# Patient Record
Sex: FEMALE | Race: OTHER
[De-identification: names, ages, dates, MRNs, and addresses within clinical notes are randomized per-mention and may not be internally consistent; named-entity substitution may affect disease eponyms.]

---

## 2024-10-25 ENCOUNTER — HOSPITAL ENCOUNTER (OUTPATIENT)
Facility: HOSPITAL | Age: 34
Setting detail: SPECIMEN
Discharge: HOME | End: 2024-10-28

## 2024-10-25 ENCOUNTER — OFFICE VISIT (OUTPATIENT)
Age: 34
End: 2024-10-25

## 2024-10-25 ENCOUNTER — INITIAL PRENATAL (OUTPATIENT)
Age: 34
End: 2024-10-25

## 2024-10-25 VITALS
HEIGHT: 63 IN | BODY MASS INDEX: 37.92 KG/M2 | DIASTOLIC BLOOD PRESSURE: 85 MMHG | OXYGEN SATURATION: 98 % | RESPIRATION RATE: 16 BRPM | WEIGHT: 214 LBS | SYSTOLIC BLOOD PRESSURE: 144 MMHG

## 2024-10-25 DIAGNOSIS — Z59.71 UNINSURED: ICD-10-CM

## 2024-10-25 DIAGNOSIS — Z13.9 ENCOUNTER FOR SCREENING INVOLVING SOCIAL DETERMINANTS OF HEALTH (SDOH): Primary | ICD-10-CM

## 2024-10-25 DIAGNOSIS — O99.210 OBESITY AFFECTING PREGNANCY, ANTEPARTUM, UNSPECIFIED OBESITY TYPE: ICD-10-CM

## 2024-10-25 DIAGNOSIS — O09.90 SUPERVISION OF HIGH RISK PREGNANCY, ANTEPARTUM: Primary | ICD-10-CM

## 2024-10-25 DIAGNOSIS — O10.919 CHRONIC HYPERTENSION AFFECTING PREGNANCY: ICD-10-CM

## 2024-10-25 LAB
BILIRUBIN, URINE, POC: NEGATIVE
BLOOD URINE, POC: NEGATIVE
CREAT UR-MCNC: <13 MG/DL
GLUCOSE URINE, POC: NEGATIVE
KETONES, URINE, POC: NEGATIVE
LEUKOCYTE ESTERASE, URINE, POC: NEGATIVE
NITRITE, URINE, POC: NEGATIVE
PH, URINE, POC: 7 (ref 4.6–8)
PROT UR-MCNC: 5 MG/DL (ref 0–11.9)
PROT/CREAT UR-RTO: <0.4
PROTEIN,URINE, POC: NEGATIVE
SPECIFIC GRAVITY, URINE, POC: 1.01 (ref 1–1.03)
URINALYSIS CLARITY, POC: CLEAR
URINALYSIS COLOR, POC: NORMAL
UROBILINOGEN, POC: NORMAL

## 2024-10-25 PROCEDURE — 87491 CHLMYD TRACH DNA AMP PROBE: CPT

## 2024-10-25 PROCEDURE — 87661 TRICHOMONAS VAGINALIS AMPLIF: CPT

## 2024-10-25 PROCEDURE — 87624 HPV HI-RISK TYP POOLED RSLT: CPT

## 2024-10-25 PROCEDURE — 81003 URINALYSIS AUTO W/O SCOPE: CPT | Performed by: FAMILY MEDICINE

## 2024-10-25 PROCEDURE — 90661 CCIIV3 VAC ABX FR 0.5 ML IM: CPT | Performed by: FAMILY MEDICINE

## 2024-10-25 PROCEDURE — 87591 N.GONORRHOEAE DNA AMP PROB: CPT

## 2024-10-25 PROCEDURE — 88175 CYTOPATH C/V AUTO FLUID REDO: CPT

## 2024-10-25 RX ORDER — NIFEDIPINE 30 MG/1
30 TABLET, EXTENDED RELEASE ORAL DAILY
Qty: 60 TABLET | Refills: 11 | Status: SHIPPED | OUTPATIENT
Start: 2024-10-25

## 2024-10-25 ASSESSMENT — PATIENT HEALTH QUESTIONNAIRE - PHQ9
1. LITTLE INTEREST OR PLEASURE IN DOING THINGS: NOT AT ALL
SUM OF ALL RESPONSES TO PHQ QUESTIONS 1-9: 0
2. FEELING DOWN, DEPRESSED OR HOPELESS: NOT AT ALL
SUM OF ALL RESPONSES TO PHQ QUESTIONS 1-9: 0
SUM OF ALL RESPONSES TO PHQ QUESTIONS 1-9: 0
SUM OF ALL RESPONSES TO PHQ9 QUESTIONS 1 & 2: 0
SUM OF ALL RESPONSES TO PHQ QUESTIONS 1-9: 0

## 2024-10-25 NOTE — PROGRESS NOTES
SW Navigator met with patient to complete initial social needs assessment. Patient verified and confirmed demographics on file.      Review of SDOH:   No social needs at present time    Medical insurance? Uninsured, applied con [facility]    Psychosocial Hx:  - Country of Origin, [place], [place] years in [place]   - Client's feelings about pregnancy, positive  - FOB aware of pregnancy, yes  - Patient and FOB's relationship, no relationship  - FOB's feeling about pregnancy, unknown  - Lives with her children  - Highest level of Education, college  - Employment? , creates wood pallets   - Primary language, [place] Islander  - Prefers written materials in [place] Islander  - Communication issues, language barrier  - WIC? enrolled  - Support system, self  - Personal - Do you have a close network of family and friends, friends only  - Support Services - Are you aware of social programs available to you? Do you use them? yes  - OB: Do you have the support needed once baby arrives? unsure  - Any other worries or concerns, no    Personal Safety:  Domestic violence - no hx of domestic violence   General safety - Feels safe in home and in neighborhood    Patient is 34 y.o. pregnant female. Patient lives with her children; 15 and 4 years of age. Limited support; family lives in [place]. FOB is not involved. Patient currently employed and plans to work until she's no longer able to.  Patient receives WIC and SNAP benefits, pending Medicaid coverage. Patient plans to breat feed baby; SW to request pump later in pregnancy.    Concern:  1) uninsured    Plan:  1) Medicaid pending; follow-up with [facility]    EDIN Villegas   Navigator

## 2024-10-25 NOTE — PROGRESS NOTES
History and Physical    Patient: Tamiko Her MRN: [id number]  SSN: [id number]    YOB: 1990  Age: 34 y.o.  Sex: female      Subjective:      Tamiko Her is a 34 y.o. female  at 10w4d who presents for IOB visit.     Unplanned, happy  FOB not involved, isn't sure if he'll support her after baby is born  Works in wood cutting   2 other children who live with her   Is certain of LMP  Seen at [facility] for bleeding, resolved, US was reassuring at that time  When at [facility], was given BP meds and was taking it but stopped the medication once the numbers went down, had been checking on home cuff.      No past medical history on file.  No past surgical history on file.   No family history on file.  Social History     Tobacco Use    Smoking status: Not on file    Smokeless tobacco: Not on file   Substance Use Topics    Alcohol use: Not on file      Prior to Admission medications    Not on File        No Known Allergies    Review of Systems:  ROS negative except as noted in HPI.    Objective:     Vitals:    10/25/24 1321   BP: (!) 144/85   Site: Left Upper Arm   Position: Sitting   Cuff Size: Medium Adult   Resp: 16   SpO2: 98%   Weight: 97.1 kg (214 lb)   Height: 1.59 m (5' 2.6\")        Physical Exam:  See prenatal physical exam.    Assessment/Plan:   33yo  @ 10w4d by LMP=7w dating US   IUP: IOB labs today, dating US in CareEverywhere, anatomy requested  Chronic HTN: new dx, was very high at [facility] recently, will start Nifedipine 30 XL bid, baseline labs today, GTT+A1C, ASA at 12w  Obesity    Signed By: Raine Yang,      2024

## 2024-10-26 LAB
ABO + RH BLD: NORMAL
BACTERIA SPEC CULT: NORMAL
BLOOD BANK CMNT PATIENT-IMP: NORMAL
BLOOD GROUP ANTIBODIES SERPL: NORMAL
CC UR VC: NORMAL
Lab: NORMAL
Lab: NORMAL
REFERENCE LAB: NORMAL
SERVICE CMNT-IMP: NORMAL
SPECIMEN EXP DATE BLD: NORMAL

## 2024-10-27 LAB
ALBUMIN SERPL-MCNC: 3.8 G/DL (ref 3.5–5)
ALBUMIN/GLOB SERPL: 1.1 (ref 1.1–2.2)
ALP SERPL-CCNC: 65 U/L (ref 45–117)
ALT SERPL-CCNC: 25 U/L (ref 12–78)
ANION GAP SERPL CALC-SCNC: 8 MMOL/L (ref 2–12)
AST SERPL-CCNC: 11 U/L (ref 15–37)
BILIRUB SERPL-MCNC: 0.3 MG/DL (ref 0.2–1)
BUN SERPL-MCNC: 9 MG/DL (ref 6–20)
BUN/CREAT SERPL: 14 (ref 12–20)
CALCIUM SERPL-MCNC: 9 MG/DL (ref 8.5–10.1)
CHLORIDE SERPL-SCNC: 109 MMOL/L (ref 97–108)
CO2 SERPL-SCNC: 22 MMOL/L (ref 21–32)
CREAT SERPL-MCNC: 0.63 MG/DL (ref 0.55–1.02)
ERYTHROCYTE [DISTWIDTH] IN BLOOD BY AUTOMATED COUNT: 13.6 % (ref 11.5–14.5)
EST. AVERAGE GLUCOSE BLD GHB EST-MCNC: 103 MG/DL
GLOBULIN SER CALC-MCNC: 3.5 G/DL (ref 2–4)
GLUCOSE 1H P 100 G GLC PO SERPL-MCNC: 96 MG/DL (ref 65–140)
GLUCOSE SERPL-MCNC: 99 MG/DL (ref 65–100)
HBA1C MFR BLD: 5.2 % (ref 4–5.6)
HBV SURFACE AB SER QL: NONREACTIVE
HBV SURFACE AB SER-ACNC: <3.1 MIU/ML
HBV SURFACE AG SER QL: 0.47 INDEX
HBV SURFACE AG SER QL: NEGATIVE
HCT VFR BLD AUTO: 36.9 % (ref 35–47)
HCV AB SER IA-ACNC: <0.02 INDEX
HCV AB SERPL QL IA: NONREACTIVE
HGB BLD-MCNC: 12.6 G/DL (ref 11.5–16)
HIV 1+2 AB+HIV1 P24 AG SERPL QL IA: NONREACTIVE
HIV 1/2 RESULT COMMENT: NORMAL
MCH RBC QN AUTO: 30.6 PG (ref 26–34)
MCHC RBC AUTO-ENTMCNC: 34.1 G/DL (ref 30–36.5)
MCV RBC AUTO: 89.6 FL (ref 80–99)
NRBC # BLD: 0 K/UL (ref 0–0.01)
NRBC BLD-RTO: 0 PER 100 WBC
PLATELET # BLD AUTO: 332 K/UL (ref 150–400)
PMV BLD AUTO: 9.4 FL (ref 8.9–12.9)
POTASSIUM SERPL-SCNC: 3.4 MMOL/L (ref 3.5–5.1)
PROT SERPL-MCNC: 7.3 G/DL (ref 6.4–8.2)
RBC # BLD AUTO: 4.12 M/UL (ref 3.8–5.2)
RPR SER QL: NONREACTIVE
RUBV IGG SERPL IA-ACNC: NORMAL IU/ML
SODIUM SERPL-SCNC: 139 MMOL/L (ref 136–145)
VZV IGG SER IA-ACNC: REACTIVE
WBC # BLD AUTO: 8.7 K/UL (ref 3.6–11)

## 2024-10-29 ENCOUNTER — TELEPHONE (OUTPATIENT)
Age: 34
End: 2024-10-29

## 2024-10-29 LAB
C TRACH RRNA SPEC QL NAA+PROBE: NEGATIVE
HBV CORE AB SERPL QL IA: NEGATIVE
HGB A MFR BLD: 97 % (ref 96.4–98.8)
HGB A2 MFR BLD COLUMN CHROM: 2.7 % (ref 1.8–3.2)
HGB F MFR BLD: 0.3 % (ref 0–2)
HGB FRACT BLD-IMP: NORMAL
HGB S MFR BLD: 0 %
HPV I/H RISK 1 DNA CVX QL PROBE+SIG AMP: NEGATIVE
N GONORRHOEA RRNA SPEC QL NAA+PROBE: NEGATIVE
SPECIMEN SOURCE: NORMAL
T VAGINALIS RRNA SPEC QL NAA+PROBE: NEGATIVE

## 2024-10-29 NOTE — TELEPHONE ENCOUNTER
Received an referral to have pt seen with [facility]. Scheduled pt for 01/08 @ 8:45 at [facility]. Called pt x2 via  id # [id number] to confirm appt, no answer, left vm to have pt give the office a call. Called friend on friend and the number is disconnected.

## 2024-10-31 ENCOUNTER — TELEPHONE (OUTPATIENT)
Age: 34
End: 2024-10-31

## 2024-10-31 NOTE — TELEPHONE ENCOUNTER
1st attempt unable to reach patient.Left vociemail for patient with interperter services to callback to schedule appointment

## 2024-11-05 ENCOUNTER — TELEPHONE (OUTPATIENT)
Age: 34
End: 2024-11-05

## 2024-11-05 NOTE — TELEPHONE ENCOUNTER
: #[id number]     Spoke with Porfirio (Friend) who identified patient with two patient identifiers (name and ).    On Release of Information Form? No    This nurse informed caller that patient has not listed him as someone that can receive medical advice on patient's behalf. Caller stated that he was calling to help schedule patient an appointment for bilateral leg pain as patient is currently at work and not with him.    When asked by this nurse, caller stated that patient has had no shortness of breath, dyspnea on exertion, or chest pain.    Caller asked for patient to be scheduled for Thursday or Friday; appointment scheduled. This nurse offered to have patient seen 24 at 3:00 PM with Dr. Ferguson; caller declined at this time stating that he wanted to speak with patient prior to scheduling same day appointment.    Caller agreed and voiced understanding with no further questions or concerns at this time.     Future Appointments   Date Time Provider Department Center   2024  3:20 PM Luis Enrique Taylor MD [facility] DEP   11/15/2024  3:40 PM Raine Yang DO [facility] DEP   2025  8:45 AM ULTRASOUND 1 AMB [facility] BS MARIANNE Marrero RN

## 2024-11-15 ENCOUNTER — ROUTINE PRENATAL (OUTPATIENT)
Age: 34
End: 2024-11-15

## 2024-11-15 VITALS
OXYGEN SATURATION: 98 % | HEIGHT: 63 IN | DIASTOLIC BLOOD PRESSURE: 77 MMHG | HEART RATE: 85 BPM | SYSTOLIC BLOOD PRESSURE: 125 MMHG | BODY MASS INDEX: 38.7 KG/M2 | RESPIRATION RATE: 17 BRPM | WEIGHT: 218.4 LBS

## 2024-11-15 DIAGNOSIS — Z13.9 ENCOUNTER FOR SCREENING INVOLVING SOCIAL DETERMINANTS OF HEALTH (SDOH): Primary | ICD-10-CM

## 2024-11-15 DIAGNOSIS — O09.90 SUPERVISION OF HIGH RISK PREGNANCY, ANTEPARTUM: ICD-10-CM

## 2024-11-15 RX ORDER — AMLODIPINE BESYLATE 5 MG/1
5 TABLET ORAL DAILY
COMMUNITY
Start: 2024-09-29

## 2024-11-15 SDOH — ECONOMIC STABILITY: INCOME INSECURITY: HOW HARD IS IT FOR YOU TO PAY FOR THE VERY BASICS LIKE FOOD, HOUSING, MEDICAL CARE, AND HEATING?: HARD

## 2024-11-15 SDOH — ECONOMIC STABILITY: FOOD INSECURITY: WITHIN THE PAST 12 MONTHS, THE FOOD YOU BOUGHT JUST DIDN'T LAST AND YOU DIDN'T HAVE MONEY TO GET MORE.: PATIENT DECLINED

## 2024-11-15 SDOH — ECONOMIC STABILITY: FOOD INSECURITY: WITHIN THE PAST 12 MONTHS, YOU WORRIED THAT YOUR FOOD WOULD RUN OUT BEFORE YOU GOT MONEY TO BUY MORE.: SOMETIMES TRUE

## 2024-11-15 NOTE — PATIENT INSTRUCTIONS
https://www.[employer]/individuals-families  [employer].com  Lo que ofrecen: recursos gratuitos para ayudar a encontrar un hogar que se adapte a henry necesidades y presupuesto. Los proveedores de propiedades pueden enumerar apartamentos o zavaleta para alquilar en cualquier momento.  Sitio web: https://www.[employer]/.  Número de teléfono: [phone], de lunes a viernes de 9:00 a. m. a 8:00 p. m.

## 2024-11-15 NOTE — PROGRESS NOTES
Pain that radiates from low back/butt down her leg, electric feeling, bilateral, tylenol helps  Checking BP at home: systolic 122, 130, 120    35yo  @ 13w4d by LMP=7w dating US   IUP: RH pos, dating US in CareEverywhere, anatomy requested - needs it changed to a Friday, Ada will change it and we can give her the updated info at next appt here   Chronic HTN: new dx, was very high at [facility] recently, controlled on Nifedipine 30 XL bid, baseline UPC \"<0.4\", GTT passed, A1C 5.2, ASA at 12w  Obesity  Sciatica pain: counseled, handout given for home exercises, tylenol prn

## 2024-11-15 NOTE — PROGRESS NOTES
Tamiko Patino is a 34 y.o. female      Chief Complaint   Patient presents with    Routine Prenatal Visit     Have you been able to feel fetal movement? -yes  Bleeding? -no  Loss of Fluids? -no  Discharge? -no  Contractions? -no    - concerns: back pain-that comes and goes - it runs from her back into the bottom of her feet for x3 weeks but has gotten slightly better    - taking Tylenol for pain   :Marixa GRAYSON#[id number]       \"Have you been to the ER, urgent care clinic since your last visit?  Hospitalized since your last visit?\"    NO    “Have you seen or consulted any other health care providers outside of [facility] since your last visit?”    NO              Vitals:    11/15/24 1556   BP: 125/77   Site: Right Upper Arm   Position: Sitting   Cuff Size: Large Adult   Pulse: 85   Resp: 17   SpO2: 98%   Weight: 99.1 kg (218 lb 6.4 oz)   Height: 1.59 m (5' 2.6\")            Health Maintenance Due   Topic Date Due    Varicella vaccine (1 of 2 - 13+ 2-dose series) Never done    Hepatitis B vaccine (1 of 3 - 19+ 3-dose series) Never done    DTaP/Tdap/Td vaccine (1 - Tdap) Never done    COVID-19 Vaccine (1 - 2023-24 season) Never done         Medication Reconciliation completed, changes noted.  Please  Update medication list.

## 2024-11-18 LAB
AFP INTERP SERPL-IMP: NORMAL
AFP MOM SERPL: 0.61
AFP SERPL-MCNC: 14.3 NG/ML
AGE AT DELIVERY: 35.5 YR
AGE OF EGG DONOR: NORMAL
AGE OF EGG DONOR: NORMAL
COMMENT: NORMAL
DONOR EGG?: NO
DONOR EGG?: NORMAL
FAMILY HISTORY NTD: NORMAL
FHX NTD (Y OR N): NORMAL
GA METHOD: NORMAL
GA: 15.1 WEEKS
IDDM PATIENT QL: NO
INSULIN DEP. DIABETIC: NORMAL
Lab: 218
Lab: NORMAL
Lab: NORMAL
MAT SCN FOR FETAL ABNORMALITIES SERPL: NORMAL
MULTIPLE PREGNANCY: NO
NEURAL TUBE DEFECT RISK FETUS: NORMAL
NUMBER OF FETUSES: NO
OTHER INDICATIONS: NO
OTHER INDICATIONS: NORMAL
PREVIOUSLY ELEVATED AFP (Y OR N): 13
PREVIOUSLY ELEVATED AFP (Y OR N): NO
PRIOR 1ST TRIM TESTING ?: NO
PRIOR 2ND TRIM TESTING ?: NO
PRIOR DS/NTD SCREEN CURRENT PREGNANCY?: NO
PRIOR DS/NTD SCREEN CURRENT PREGNANCY?: NORMAL
PRIOR FIRST TRIMESTER TESTING (Y OR N ): NORMAL
PRIOR PREGNANCY WITH DOWN SYNDROME (Y OR N): 1
PRIOR PREGNANCY WITH DOWN SYNDROME (Y OR N): NO
TYPE OF EGG DONOR: NORMAL
TYPE OF EGG DONOR: NORMAL

## 2024-11-24 LAB
Lab: NORMAL
NTRA FETAL FRACTION: NORMAL
NTRA GENDER OF FETUS: NORMAL
NTRA MONOSOMY X AGE-BASED RISK TEXT: NORMAL
NTRA MONOSOMY X RESULT TEXT: NORMAL
NTRA MONOSOMY X RISK SCORE TEXT: NORMAL
NTRA TRIPLOIDY RESULT TEXT: NORMAL
NTRA TRISOMY 13 AGE-BASED RISK TEXT: NORMAL
NTRA TRISOMY 13 RESULT TEXT: NORMAL
NTRA TRISOMY 13 RISK SCORE TEXT: NORMAL
NTRA TRISOMY 18 AGE-BASED RISK TEXT: NORMAL
NTRA TRISOMY 18 RESULT TEXT: NORMAL
NTRA TRISOMY 18 RISK SCORE TEXT: NORMAL
NTRA TRISOMY 21 AGE-BASED RISK TEXT: NORMAL
NTRA TRISOMY 21 RESULT TEXT: NORMAL
NTRA TRISOMY 21 RISK SCORE TEXT: NORMAL

## 2024-12-04 LAB
Lab: NEGATIVE
Lab: NORMAL
NTRA ALPHA-THALASSEMIA: NEGATIVE
NTRA BETA-HEMOGLOBINOPATHIES: NEGATIVE
NTRA CANAVAN DISEASE: NEGATIVE
NTRA CYSTIC FIBROSIS: NEGATIVE
NTRA DUCHENNE/BECKER MUSCULAR DYSTROPHY: NEGATIVE
NTRA FAMILIAL DYSAUTONOMIA: NEGATIVE
NTRA FRAGILE X SYNDROME: NEGATIVE
NTRA GALACTOSEMIA: NEGATIVE
NTRA GAUCHER DISEASE: NEGATIVE
NTRA MEDIUM CHAIN ACYL-COA DEHYDROGENASE DEFICIENCY: NEGATIVE
NTRA POLYCYSTIC KIDNEY DISEASE, AUTOSOMAL RECESSIVE: NEGATIVE
NTRA SMITH-LEMLI-OPITZ SYNDROME: NEGATIVE
NTRA SPINAL MUSCULAR ATROPHY: NEGATIVE
NTRA TAY-SACHS DISEASE: NEGATIVE

## 2024-12-13 ENCOUNTER — ROUTINE PRENATAL (OUTPATIENT)
Age: 34
End: 2024-12-13

## 2024-12-13 VITALS
TEMPERATURE: 98.6 F | HEART RATE: 80 BPM | HEIGHT: 63 IN | SYSTOLIC BLOOD PRESSURE: 146 MMHG | BODY MASS INDEX: 38.84 KG/M2 | OXYGEN SATURATION: 98 % | RESPIRATION RATE: 16 BRPM | DIASTOLIC BLOOD PRESSURE: 83 MMHG | WEIGHT: 219.2 LBS

## 2024-12-13 DIAGNOSIS — O09.90 SUPERVISION OF HIGH RISK PREGNANCY, ANTEPARTUM: ICD-10-CM

## 2024-12-13 DIAGNOSIS — Z3A.17 17 WEEKS GESTATION OF PREGNANCY: Primary | ICD-10-CM

## 2024-12-13 DIAGNOSIS — O10.919 CHRONIC HYPERTENSION AFFECTING PREGNANCY: ICD-10-CM

## 2024-12-13 RX ORDER — NIFEDIPINE 30 MG/1
30 TABLET, EXTENDED RELEASE ORAL 2 TIMES DAILY
Qty: 60 TABLET | Refills: 11 | Status: SHIPPED | OUTPATIENT
Start: 2024-12-13

## 2024-12-13 NOTE — PROGRESS NOTES
Session Code 72608 / : Gianfranco #[id number]   Department Code: [phone]       No chief complaint on file.      Patient identified with 2 patient identifiers (name and D.O.B)    Patient is a  at 17w4d    Leakage of Fluid: NO  Vaginal Bleeding: NO  Prenatal vitamins: YES  Having Contractions: NO  Pain: NO    LMP 2024 (Exact Date)     Immunization History   Administered Date(s) Administered    Influenza, FLUCELVAX, (age 6 mo+) IM, Trivalent PF, 0.5mL 10/25/2024       1. Have you been to the ER, urgent care clinic since your last visit?  Hospitalized since your last visit?NO    2. Have you seen or consulted any other health care providers outside of the [facility] since your last visit?  Include any pap smears or colon screening. NO    
this encounter.    Labor precautions discussed, including: Regular painful contractions, lasting for greater than one hour, taking your breath away; any vaginal bleeding; any leakage of fluid; or absent or decreased fetal movement. Call M.D. on call if any of these symptoms or signs occur.    I have discussed the diagnosis with the patient and the intended plan as seen in the above orders.  The patient has received an after-visit summary and questions were answered concerning future plans.  I have discussed medication side effects and warnings with the patient as well. Informed pt to return to the office or go to the ER if she experiences vaginal bleeding, vaginal discharge, leaking of fluid, pelvic cramping.    Pt seen and discussed with Dr. Yang (attending physician)    Alisha Ferguson MD  Family Medicine Resident

## 2024-12-20 ENCOUNTER — ROUTINE PRENATAL (OUTPATIENT)
Age: 34
End: 2024-12-20

## 2024-12-20 VITALS
TEMPERATURE: 98.9 F | WEIGHT: 219.2 LBS | SYSTOLIC BLOOD PRESSURE: 117 MMHG | OXYGEN SATURATION: 98 % | DIASTOLIC BLOOD PRESSURE: 77 MMHG | RESPIRATION RATE: 15 BRPM | HEIGHT: 63 IN | BODY MASS INDEX: 38.84 KG/M2 | HEART RATE: 79 BPM

## 2024-12-20 DIAGNOSIS — Z3A.18 18 WEEKS GESTATION OF PREGNANCY: Primary | ICD-10-CM

## 2024-12-20 DIAGNOSIS — O10.919 CHRONIC HYPERTENSION AFFECTING PREGNANCY: ICD-10-CM

## 2024-12-20 NOTE — PROGRESS NOTES
Session Code 48358 / : #[id number]   Department Code: [phone]       Chief Complaint   Patient presents with    Routine Prenatal Visit     No concerns.        Patient identified with 2 patient identifiers (name and D.O.B)    Patient is a  at 18w4d    Leakage of Fluid: NO  Vaginal Bleeding: NO  Prenatal vitamins: YES  Having Contractions: NO  Pain: NO    LMP 2024 (Exact Date)     Immunization History   Administered Date(s) Administered    Influenza, FLUCELVAX, (age 6 mo+) IM, Trivalent PF, 0.5mL 10/25/2024       1. Have you been to the ER, urgent care clinic since your last visit?  Hospitalized since your last visit?NO    2. Have you seen or consulted any other health care providers outside of the [facility] since your last visit?  Include any pap smears or colon screening. NO    
decreased fetal movement. Call M.D. on call if any of these symptoms or signs occur.    I have discussed the diagnosis with the patient and the intended plan as seen in the above orders.  The patient has received an after-visit summary and questions were answered concerning future plans.  I have discussed medication side effects and warnings with the patient as well. Informed pt to return to the office or go to the ER if she experiences vaginal bleeding, vaginal discharge, leaking of fluid, pelvic cramping.    Pt seen and discussed with Dr. Yang (attending physician)    Alisha Ferguson MD  Family Medicine Resident

## 2025-01-10 ENCOUNTER — ROUTINE PRENATAL (OUTPATIENT)
Age: 35
End: 2025-01-10

## 2025-01-10 VITALS
DIASTOLIC BLOOD PRESSURE: 82 MMHG | SYSTOLIC BLOOD PRESSURE: 138 MMHG | WEIGHT: 224 LBS | RESPIRATION RATE: 16 BRPM | OXYGEN SATURATION: 98 % | BODY MASS INDEX: 40.19 KG/M2

## 2025-01-10 DIAGNOSIS — O10.919 CHRONIC HYPERTENSION AFFECTING PREGNANCY: ICD-10-CM

## 2025-01-10 DIAGNOSIS — O09.90 SUPERVISION OF HIGH RISK PREGNANCY, ANTEPARTUM: ICD-10-CM

## 2025-01-10 RX ORDER — NIFEDIPINE 30 MG/1
30 TABLET, EXTENDED RELEASE ORAL 2 TIMES DAILY
Qty: 60 TABLET | Refills: 11 | Status: SHIPPED | OUTPATIENT
Start: 2025-01-10

## 2025-01-10 ASSESSMENT — PATIENT HEALTH QUESTIONNAIRE - PHQ9
SUM OF ALL RESPONSES TO PHQ QUESTIONS 1-9: 0
SUM OF ALL RESPONSES TO PHQ QUESTIONS 1-9: 0
1. LITTLE INTEREST OR PLEASURE IN DOING THINGS: NOT AT ALL
SUM OF ALL RESPONSES TO PHQ QUESTIONS 1-9: 0
SUM OF ALL RESPONSES TO PHQ QUESTIONS 1-9: 0
2. FEELING DOWN, DEPRESSED OR HOPELESS: NOT AT ALL
SUM OF ALL RESPONSES TO PHQ9 QUESTIONS 1 & 2: 0

## 2025-01-10 NOTE — PROGRESS NOTES
Pain that radiates from low back/butt down her leg, electric feeling, bilateral, tylenol helps  Checking BP at home: systolic 122, 130, 120    33yo  @ 21w4d by LMP=7w dating US   IUP: RH pos, dating US in CareEverywhere, missed anatomy, rescheduled  Chronic HTN: new dx, was very high at [facility] recently, controlled on Nifedipine 30 XL bid, baseline UPC \"<0.4\", GTT passed, A1C 5.2, ASA at 12w  Obesity: early GTT+A1C normal   Sciatica pain: counseled, handout given for home exercises, tylenol prn

## 2025-01-10 NOTE — PROGRESS NOTES
Chief Complaint   Patient presents with    Routine Prenatal Visit        Patient identified by name and . Patient is a  at 21w4d.    Taking prenatal vitamins: Yes  Leakage of fluid: No  Vaginal bleeding: No  Feeling baby move if over 20 weeks: Yes  Contractions: No  Pain: No    Vitals:    01/10/25 1603   BP: 138/82   Site: Left Upper Arm   Position: Sitting   Cuff Size: Large Adult   Resp: 16   SpO2: 98%   Weight: 101.6 kg (224 lb)        Immunization History   Administered Date(s) Administered    Influenza, FLUCELVAX, (age 6 mo+) IM, Trivalent PF, 0.5mL 10/25/2024       1. Have you been to the ER, urgent care clinic since your last visit?  Hospitalized since your last visit?No    2. Have you seen or consulted any other health care providers outside of the [facility] since your last visit?  Include any pap smears or colon screening. No    Patient informed about upcoming appointments and given handout with date/time/location.

## 2025-01-17 ENCOUNTER — ROUTINE PRENATAL (OUTPATIENT)
Age: 35
End: 2025-01-17

## 2025-01-17 ENCOUNTER — ROUTINE PRENATAL (OUTPATIENT)
Age: 35
End: 2025-01-17
Payer: MEDICAID

## 2025-01-17 VITALS — DIASTOLIC BLOOD PRESSURE: 83 MMHG | HEART RATE: 89 BPM | SYSTOLIC BLOOD PRESSURE: 125 MMHG

## 2025-01-17 DIAGNOSIS — O09.522 SUPERVISION OF ELDERLY MULTIGRAVIDA IN SECOND TRIMESTER: Primary | ICD-10-CM

## 2025-01-17 DIAGNOSIS — Z3A.22 22 WEEKS GESTATION OF PREGNANCY: ICD-10-CM

## 2025-01-17 DIAGNOSIS — Z3A.22 22 WEEKS GESTATION OF PREGNANCY: Primary | ICD-10-CM

## 2025-01-17 PROCEDURE — 76811 OB US DETAILED SNGL FETUS: CPT | Performed by: OBSTETRICS & GYNECOLOGY

## 2025-01-17 PROCEDURE — 99203 OFFICE O/P NEW LOW 30 MIN: CPT | Performed by: OBSTETRICS & GYNECOLOGY

## 2025-01-17 PROCEDURE — 76817 TRANSVAGINAL US OBSTETRIC: CPT | Performed by: OBSTETRICS & GYNECOLOGY

## 2025-01-17 NOTE — PROGRESS NOTES
Patient was seen 1/17/2025      Please look under media to view full consult and ultrasound report in ViewPoint.         Olvin Child MD  Maternal Fetal Medicine

## 2025-01-17 NOTE — PROGRESS NOTES
Tamiko Patino is a 34 y.o. female seen on 25 in our [place] office for genetic counseling regarding advanced maternal age. Tamiko Patino will be 34 at the Estimated Date of Delivery: 25; . Genetic counseling was performed in person today. The patient was unaccompanied to her appointment. Due to a language barrier, an [employer]  was used for the duration of today's appointment ( ID# [id number]).    Impression and Recommendations:    - Advanced maternal age was reviewed with the patient, including relevant genetic risks, screening and testing options, her normal NIPT results and today's limited views on ultrasound.  - The patient DECLINED diagnostic amniocentesis at this time, although she may consider it at a future date if abnormalities are seen.  - The patient's normal carrier screening results were reviewed today.  - An ultrasound and MFM consult were performed today by Dr. Olvin Child MD. Please see his note for further details.    Family and pregnancy histories were taken. The following information was discussed with the patient:    Genetic Screening:    The association between advancing maternal age and increasing risk of chromosomal aneuploidy was reviewed. Based on the patient's age of 34 at delivery, her initial risk to have a baby with Down syndrome was 1 in 349 and her initial risk to have a baby with any chromosomal aneuploidy was 1 in 179. Fortunately, the patient has had normal non-invasive prenatal testing (NIPT), indicating a <1 in 10,000 risk for Down syndrome, trisomy 18, trisomy 13 and sex chromosomal aneuploidies in the current pregnancy. Results were consistent with a male fetus.    Nevertheless, the benefits, risks and limitations of non-invasive prenatal testing (NIPT), ultrasonography and amniocentesis in the diagnosis of fetal aneuploidy were reviewed.     Amniocentesis is typically performed between 16 and 20 weeks

## 2025-01-17 NOTE — PROCEDURES
PATIENT: CATRACHITA [place]   -  : 1990   -  DOS:2025   -  INTERPRETING PROVIDER:Olvin Child,   Indication  ========    Advanced Maternal Age, Obesity in pregnancy    Method  ======    Transabdominal ultrasound examination. View: suboptimal due to maternal acoustic properties    Dating  ======    LMP on: 2024  GA by LMP 22 w + 4 d  SHAMA by LMP: 2025  Previous Ultrasound on: 2024  Type of prior assessment: GA  GA at prior assessment date 7 w + 1 d  GA by previous U/S 22 w + 6 d  SHAMA by previous Ultrasound: 2025  Ultrasound examination on: 2025  GA by U/S based upon: AC, BPD, Femur, HC  GA by U/S 24 w + 0 d  SHAMA by U/S: 2025  Assigned: based on the LMP, selected on 2025  Assigned GA 22 w + 4 d  Assigned SHAMA: 2025    Fetal Growth Overview  =================    Exam date        GA              BPD (mm)          HC (mm)              AC (mm)               FL (mm)             HL (mm)            EFW (g)  2025        22w 4d        59.4     94%        220.2    89%        202.9     96%        39.8    48%        39.8     90%        653    96%    Fetal Biometry  ============    Standard  BPD 59.4 mm 24w 2d 94% Hadlock  OFD 78.9 mm 25w 5d >99% William  .2 mm 24w 0d 89% Hadlock  Cerebellum tr 24.7 mm 22w 4d 77% Hill  .9 mm 24w 6d 96% Hadlock  Femur 39.8 mm 22w 6d 48% Hadlock  Humerus 39.8 mm 24w 1d 90% William   g 23w 6d 96% Hadlock  EFW (lb) 1 lb  EFW (oz) 7 oz  EFW by: Hadlock (BPD-HC-AC-FL)  Extended   3.2 mm  CM 6.5 mm  77% Nicolaides  Nasal bone 7.9 mm  Head / Face / Neck  Nasal bone: present  Other Structures   bpm    General Evaluation  ==============    Cardiac activity present.  bpm. Fetal movements: visualized. Presentation: Cephalic  Placenta: Placental site: anterior, PREVIA  Umbilical cord: Cord vessels: 3 vessel cord. Insertion site: central  Amniotic fluid: Amount of AF: normal. MVP 5.7 cm    Fetal

## 2025-02-07 ENCOUNTER — ROUTINE PRENATAL (OUTPATIENT)
Age: 35
End: 2025-02-07

## 2025-02-07 VITALS
SYSTOLIC BLOOD PRESSURE: 112 MMHG | WEIGHT: 225.8 LBS | TEMPERATURE: 98.5 F | HEART RATE: 79 BPM | OXYGEN SATURATION: 97 % | DIASTOLIC BLOOD PRESSURE: 69 MMHG | RESPIRATION RATE: 15 BRPM | HEIGHT: 63 IN | BODY MASS INDEX: 40.01 KG/M2

## 2025-02-07 DIAGNOSIS — O09.90 SUPERVISION OF HIGH RISK PREGNANCY, ANTEPARTUM: ICD-10-CM

## 2025-02-07 DIAGNOSIS — O10.919 CHRONIC HYPERTENSION AFFECTING PREGNANCY: ICD-10-CM

## 2025-02-07 DIAGNOSIS — Z3A.25 25 WEEKS GESTATION OF PREGNANCY: Primary | ICD-10-CM

## 2025-02-07 PROCEDURE — 0502F SUBSEQUENT PRENATAL CARE: CPT

## 2025-02-11 NOTE — PROGRESS NOTES
I reviewed with the resident the medical history and the resident's findings on the physical examination.  I discussed with the resident the patient's diagnosis and concur with the plan.     Christina Kim MD 2/11/2025   
Session Code [id number] / : Sid #[id number]   Department Code: [phone]       No chief complaint on file.      Patient identified with 2 patient identifiers (name and D.O.B)    Patient is a  at 25w4d    Leakage of Fluid: NO  Vaginal Bleeding: NO  Fetal Movement if > 20 weeks: YES  Prenatal vitamins: YES  Having Contractions: NO  Pain: NO    Wt 102.4 kg (225 lb 12.8 oz)   LMP 2024 (Exact Date)   BMI 40.51 kg/m²     Immunization History   Administered Date(s) Administered    Influenza, FLUCELVAX, (age 6 mo+) IM, Trivalent PF, 0.5mL 10/25/2024       1. Have you been to the ER, urgent care clinic since your last visit?  Hospitalized since your last visit?NO    2. Have you seen or consulted any other health care providers outside of the [facility] since your last visit?  Include any pap smears or colon screening. NO    
Provider: Marty Zurita mgmt. in labor: TBD  Feeding: TBD  Circ:  TBD  Postpartum BC: TBD  ---------------------------------------    No orders of the defined types were placed in this encounter.    Labor precautions discussed, including: Regular painful contractions, lasting for greater than one hour, taking your breath away; any vaginal bleeding; any leakage of fluid; or absent or decreased fetal movement. Call M.D. on call if any of these symptoms or signs occur.    I have discussed the diagnosis with the patient and the intended plan as seen in the above orders.  The patient has received an after-visit summary and questions were answered concerning future plans.  I have discussed medication side effects and warnings with the patient as well. Informed pt to return to the office or go to the ER if she experiences vaginal bleeding, vaginal discharge, leaking of fluid, pelvic cramping.    Pt seen and discussed with Dr. Kim (attending physician)    Alisha Ferguson MD  Family Medicine Resident

## 2025-02-14 ENCOUNTER — ROUTINE PRENATAL (OUTPATIENT)
Age: 35
End: 2025-02-14
Payer: MEDICAID

## 2025-02-14 VITALS — SYSTOLIC BLOOD PRESSURE: 121 MMHG | DIASTOLIC BLOOD PRESSURE: 78 MMHG | HEART RATE: 88 BPM

## 2025-02-14 DIAGNOSIS — O99.212 OBESITY AFFECTING PREGNANCY IN SECOND TRIMESTER, UNSPECIFIED OBESITY TYPE: ICD-10-CM

## 2025-02-14 DIAGNOSIS — O09.522 AMA (ADVANCED MATERNAL AGE) MULTIGRAVIDA 35+, SECOND TRIMESTER: ICD-10-CM

## 2025-02-14 DIAGNOSIS — O10.919 CHRONIC HYPERTENSION AFFECTING PREGNANCY: Primary | ICD-10-CM

## 2025-02-14 PROCEDURE — 99213 OFFICE O/P EST LOW 20 MIN: CPT | Performed by: OBSTETRICS & GYNECOLOGY

## 2025-02-14 PROCEDURE — 76817 TRANSVAGINAL US OBSTETRIC: CPT | Performed by: OBSTETRICS & GYNECOLOGY

## 2025-02-14 PROCEDURE — 76816 OB US FOLLOW-UP PER FETUS: CPT | Performed by: OBSTETRICS & GYNECOLOGY

## 2025-02-14 NOTE — PROGRESS NOTES
Patient was seen 2/14/2025      Please look under media to view full consult and ultrasound report in ViewPoint.         Olvin Child MD  Maternal Fetal Medicine

## 2025-02-14 NOTE — PROCEDURES
PATIENT: CATRACHITA [place]   -  : 1990   -  DOS:2025   -  INTERPRETING PROVIDER:Olvin Child,   Indication  ========    Advanced Maternal Age, Obesity in pregnancy    Method  ======    Transabdominal and transvaginal ultrasound examination. View: suboptimal due to maternal acoustic properties. suboptimal due to advanced gestational age. suboptimal  shadowing from fetal limbs    Pregnancy  =========    Arevalo pregnancy. Number of fetuses: 1    Dating  ======    LMP on: 2024  GA by LMP 26 w + 4 d  SHAMA by LMP: 2025  Previous Ultrasound on: 2024  Type of prior assessment: GA  GA at prior assessment date 7 w + 1 d  GA by previous U/S 26 w + 6 d  SHAMA by previous Ultrasound: 2025  Ultrasound examination on: 2025  GA by U/S based upon: AC, BPD, Femur, HC  GA by U/S 28 w + 4 d  SHAMA by U/S: 2025  Assigned: based on the LMP, selected on 2025  Assigned GA 26 w + 4 d  Assigned SHAMA: 2025    Fetal Biometry  ============    Standard  BPD 71.3 mm 28w 4d 94% Hadlock  OFD 91.6 mm 29w 4d 99% William  .5 mm 28w 2d 80% Hadlock  .4 mm 28w 6d 94% Hadlock  Femur 54.0 mm 28w 4d 89% Hadlock  EFW 1,264 g 28w 2d 98% Hadlock  EFW (lb) 2 lb  EFW (oz) 13 oz  EFW by: Hadlock (BPD-HC-AC-FL)  Other Structures   bpm    General Evaluation  ==============    Cardiac activity present.  bpm. Fetal movements: visualized. Presentation: Cephalic  Placenta: Placental site: anterior, PREVIA. Placental edge-to-cervical os distance 0.0 cm  Umbilical cord: Cord vessels: 3 vessel cord. Insertion site: central  Amniotic fluid: Amount of AF: normal. MVP 6.0 cm    Fetal Anatomy  ===========    Cavum septi pellucidi: SUBOPTIMAL  Head / Neck  Neck: SUBOPTIMAL  Lips: SUBOPTIMAL  Nose: SUBOPTIMAL  Face  Coronal face: SUBOPTIMAL  Palate: NOT VISUALIZED  Maxilla: NOT VISUALIZED  Mandible: NOT VISUALIZED  Orbits: NOT VISUALIZED  4-chamber view: NOT VISUALIZED  RVOT view: NOT

## 2025-02-26 ENCOUNTER — TELEPHONE (OUTPATIENT)
Age: 35
End: 2025-02-26

## 2025-02-27 NOTE — TELEPHONE ENCOUNTER
Received call from [facility] L& nursing staff due to patient's critical situation; patient is actively hemorrhaging via vaginal bleeding and is being prepped for emergency . Nursing staff report patient is aware that [facility] is being contacted for medical information & nursing is actively obtaining record release consent form & will fax over form to [facility] L&D floor. Answered questions about patient's OB history; specifically that patient is  with a due date of 2025 with diagnosis of chronic hypertension & placenta previa on ultrasound on 25. Patient had two prior vaginal deliveries. [facility] L& charge nurse is prepping records to be faxed once record release form is obtained.     Janell Bloom MD

## 2025-03-06 ENCOUNTER — TELEPHONE (OUTPATIENT)
Age: 35
End: 2025-03-06

## 2025-03-06 NOTE — TELEPHONE ENCOUNTER
Patient stop by the office to drop off paper work ( Certification of Health Care Provider for Family Member's Serious Health Condition under the Family and Medical Leave Act) that need to be fill out by Dr. Yang. Once complete please call the patient for  [phone]    Patient stated paperwork due by 3/15/2025    Paper place in clear protective sheet in nurse folder

## 2025-03-13 ENCOUNTER — TELEPHONE (OUTPATIENT)
Age: 35
End: 2025-03-13

## 2025-03-13 DIAGNOSIS — O10.919 CHRONIC HYPERTENSION AFFECTING PREGNANCY: Primary | ICD-10-CM

## 2025-03-13 NOTE — TELEPHONE ENCOUNTER
Patient stop by the office to  paperwork that was drop off stating she need those papers back by 3/14/2025 morning in order to keep her job Once complete contact patient she do have an appointment tomorrow at 3pm     Tamiko  [phone]

## 2025-03-14 ENCOUNTER — ROUTINE PRENATAL (OUTPATIENT)
Age: 35
End: 2025-03-14

## 2025-03-14 VITALS
OXYGEN SATURATION: 99 % | DIASTOLIC BLOOD PRESSURE: 79 MMHG | RESPIRATION RATE: 16 BRPM | BODY MASS INDEX: 39.65 KG/M2 | WEIGHT: 221 LBS | SYSTOLIC BLOOD PRESSURE: 124 MMHG | HEART RATE: 80 BPM

## 2025-03-14 DIAGNOSIS — F41.9 ANXIETY AND DEPRESSION: Primary | ICD-10-CM

## 2025-03-14 DIAGNOSIS — F32.A ANXIETY AND DEPRESSION: Primary | ICD-10-CM

## 2025-03-14 RX ORDER — SERTRALINE HYDROCHLORIDE 25 MG/1
25 TABLET, FILM COATED ORAL DAILY
Qty: 30 TABLET | Refills: 5 | Status: SHIPPED | OUTPATIENT
Start: 2025-03-14

## 2025-03-14 SDOH — ECONOMIC STABILITY: FOOD INSECURITY: WITHIN THE PAST 12 MONTHS, YOU WORRIED THAT YOUR FOOD WOULD RUN OUT BEFORE YOU GOT MONEY TO BUY MORE.: NEVER TRUE

## 2025-03-14 SDOH — ECONOMIC STABILITY: FOOD INSECURITY: WITHIN THE PAST 12 MONTHS, THE FOOD YOU BOUGHT JUST DIDN'T LAST AND YOU DIDN'T HAVE MONEY TO GET MORE.: NEVER TRUE

## 2025-03-14 SDOH — ECONOMIC STABILITY: INCOME INSECURITY: HOW HARD IS IT FOR YOU TO PAY FOR THE VERY BASICS LIKE FOOD, HOUSING, MEDICAL CARE, AND HEATING?: PATIENT DECLINED

## 2025-03-14 ASSESSMENT — PATIENT HEALTH QUESTIONNAIRE - PHQ9
SUM OF ALL RESPONSES TO PHQ QUESTIONS 1-9: 0
1. LITTLE INTEREST OR PLEASURE IN DOING THINGS: NOT AT ALL
SUM OF ALL RESPONSES TO PHQ QUESTIONS 1-9: 0
2. FEELING DOWN, DEPRESSED OR HOPELESS: NOT AT ALL

## 2025-03-14 NOTE — TELEPHONE ENCOUNTER
FMLA paperwork completed by Dr Yang. Will be scanned into chart and given to  for pickup. Can you let patient know it is ready? Thanks!

## 2025-03-14 NOTE — PROGRESS NOTES
Chief Complaint   Patient presents with    Postpartum Care       Patient identified with name and .     Vitals:    25 1524   BP: 124/79   BP Site: Left Upper Arm   Patient Position: Sitting   BP Cuff Size: Medium Adult   Pulse: 80   Resp: 16   SpO2: 99%   Weight: 100.2 kg (221 lb)        1. Have you been to the ER, urgent care clinic since your last visit?  Hospitalized since your last visit?No    2. Have you seen or consulted any other health care providers outside of the [facility] since your last visit?  Include any pap smears or colon screening. No    Health Maintenance reviewed.     EDPS 8   
Postpartum Note    34 y.o. female status post CS after bleeding from previa at [facility] on 25, presenting for postpartum visit.  Patient doing well post-partum without significant complaint.  Baby in NICU.    Lochia: normal  Pain: controlled  Baby: in NICU for likely 2 months, goes regularly to see   Sexual activity: not yet  Plan for contraception: Nexplanon   Symptoms of depression: EDPS 8, neg SI/HI  Breast/bottle: pumping   Support from FOB/family: yes    Vitals:  Resp 16   Wt 100.2 kg (221 lb)   LMP 2024 (Exact Date)   BMI 39.65 kg/m²     Exam:  Patient without distress.    Abdomen soft, fundus firm at level of umbilicus, nontender. Incision C/D/I, ABDIEL dressing removed                Psych - normal mood and affect       Assessment and Plan:    Tamiko Patino is a 34 y.o.  s/p CS at 28w2d at [facility] due to placenta previa hemorrhage.  Patient having uncomplicated post-partum course.      Continue routine care  Call clinic or make appointment for symptoms of sadness  Trial of Zoloft for mild PP depression, denies SI/HI  Follow up for 6w visit plus Nexplanon for AUB                 Raine Yang, DO      
Comment: If any changes pt will f/u.\\nDiscussed RBSe of biopsy vs monitor. Pt will monitor. rtc for biopsy if lesion changes \\nAdvised future FBE in 6 months\\yolanda questions were answered.
Detail Level: Simple
Render Risk Assessment In Note?: no
Comment: If lesion persists, consider biopsy

## 2025-03-17 ENCOUNTER — TELEPHONE (OUTPATIENT)
Age: 35
End: 2025-03-17

## 2025-03-17 NOTE — TELEPHONE ENCOUNTER
----- Message from Dr. Raine Yang, DO sent at 3/14/2025  3:44 PM EDT -----  Hi this patient wants a Nexplanon.  We have scheduled her on 4/18 at 10:40, FYI.  Let me know if there's anything else we need to do.  Thanks! MV

## 2025-03-17 NOTE — TELEPHONE ENCOUNTER
I went ahead and labeled one of my clinic stock nexplanons for her. Please see sunil for it when she come sin for her Nexplanon procedure appointment.

## 2025-04-18 ENCOUNTER — PROCEDURE VISIT (OUTPATIENT)
Age: 35
End: 2025-04-18
Payer: MEDICAID

## 2025-04-18 VITALS
TEMPERATURE: 98.6 F | RESPIRATION RATE: 18 BRPM | BODY MASS INDEX: 40.22 KG/M2 | HEIGHT: 63 IN | WEIGHT: 227 LBS | SYSTOLIC BLOOD PRESSURE: 129 MMHG | OXYGEN SATURATION: 97 % | DIASTOLIC BLOOD PRESSURE: 85 MMHG | HEART RATE: 76 BPM

## 2025-04-18 DIAGNOSIS — N92.6 IRREGULAR MENSES: ICD-10-CM

## 2025-04-18 DIAGNOSIS — Z30.017 NEXPLANON INSERTION: Primary | ICD-10-CM

## 2025-04-18 LAB
HCG, PREGNANCY, URINE, POC: NEGATIVE
VALID INTERNAL CONTROL, POC: YES

## 2025-04-18 PROCEDURE — 81025 URINE PREGNANCY TEST: CPT | Performed by: FAMILY MEDICINE

## 2025-04-18 PROCEDURE — 11981 INSERTION DRUG DLVR IMPLANT: CPT | Performed by: FAMILY MEDICINE

## 2025-04-18 RX ORDER — LIDOCAINE HYDROCHLORIDE AND EPINEPHRINE 10; 10 MG/ML; UG/ML
5 INJECTION, SOLUTION INFILTRATION; PERINEURAL ONCE
Status: COMPLETED | OUTPATIENT
Start: 2025-04-18 | End: 2025-04-18

## 2025-04-18 RX ADMIN — ETONOGESTREL 68 MG: 68 IMPLANT SUBCUTANEOUS at 11:32

## 2025-04-18 RX ADMIN — LIDOCAINE HYDROCHLORIDE AND EPINEPHRINE 5 ML: 10; 10 INJECTION, SOLUTION INFILTRATION; PERINEURAL at 11:34

## 2025-04-18 NOTE — PROGRESS NOTES
Tamiko Patino is a 34 y.o. female    Identified pt with two pt identifiers(name and ). Reviewed record in preparation for visit and have obtained necessary documentation.    Chief Complaint   Patient presents with    Procedure     Patient is coming in for a Nexplanon insertion. She had baby 2025. LMP was 2025. Last sexual intercourse was 5 days ago. No other birth control just used condoms. Not Breastfeeding. No other concerns.        \"Have you been to the ER, urgent care clinic since your last visit?  Hospitalized since your last visit?\"    NO    “Have you seen or consulted any other health care providers outside of [facility] since your last visit?”    NO              Vitals:    25 1020   BP: 129/85   BP Site: Right Upper Arm   Patient Position: Sitting   BP Cuff Size: Large Adult   Pulse: 76   Resp: 18   Temp: 98.6 °F (37 °C)   TempSrc: Oral   SpO2: 97%   Weight: 103 kg (227 lb)   Height: 1.59 m (5' 2.6\")            Health Maintenance Due   Topic Date Due    Varicella vaccine (1 of 2 - 13+ 2-dose series) Never done    Hepatitis B vaccine (1 of 3 - 19+ 3-dose series) Never done    DTaP/Tdap/Td vaccine (1 - Tdap) Never done    COVID-19 Vaccine ( season) Never done       Click Here for Release of Records Request     Medication Reconciliation completed, changes noted.  Please  Update medication list.

## 2025-04-18 NOTE — PROGRESS NOTES
OFFICE PROCEDURE PROGRESS NOTE        Chart reviewed for the following:   Raine BERUMEN DO, have reviewed the History, Physical and updated the Allergic reactions for Soleibi Arden Patino     TIME OUT performed immediately prior to start of procedure:   Raine BERUMEN DO, have performed the following reviews on Soleibi Arden Patino prior to the start of the procedure:            * Patient was identified by name and date of birth   * Agreement on procedure being performed was verified  * Risks and Benefits explained to the patient  * Procedure site verified and marked as necessary  * Patient was positioned for comfort  * Consent was signed and verified     Time: 11:33 AM    Date of procedure: 4/18/2025    Procedure performed by:  Raine Yang DO and Loretta Dubois MD     Provider assisted by: Ada Aguillon LPN     Patient assisted by: self    How tolerated by patient: tolerated the procedure well with no complications    Post Procedural Pain Scale: 0 - No Hurt    Comments: none      The patient's left arm was selected and the proposed site marked with a sterile marking pen.  The site was cleaned with chloraprep x3.  The site was injected with 3mL 1% lidocaine from insertion to the full extent of the implant.  The insertion device was then used to elevated the skin and tunnel under the skin the full length of the implant.  The device was then deployed and withdrawn leaving the implant in place.  The implant was palpated to ensure proper placement.  The insertion device was inspected to insure that the entire device was deployed.     Guaze was placed over the incision and a pressure wrap was placed around the arm to reduce swelling overnight    The patient was given her information card and reminded that the device should be removed no later than three years and that contrceptive effectiveness was not guaranteed after that date.  The patient expressed understanding.